# Patient Record
Sex: MALE | Race: WHITE | HISPANIC OR LATINO
[De-identification: names, ages, dates, MRNs, and addresses within clinical notes are randomized per-mention and may not be internally consistent; named-entity substitution may affect disease eponyms.]

---

## 2022-06-02 PROBLEM — Z00.00 ENCOUNTER FOR PREVENTIVE HEALTH EXAMINATION: Status: ACTIVE | Noted: 2022-06-02

## 2022-06-03 ENCOUNTER — NON-APPOINTMENT (OUTPATIENT)
Age: 32
End: 2022-06-03

## 2022-06-03 ENCOUNTER — APPOINTMENT (OUTPATIENT)
Dept: UROLOGY | Facility: CLINIC | Age: 32
End: 2022-06-03
Payer: SELF-PAY

## 2022-06-03 VITALS
SYSTOLIC BLOOD PRESSURE: 118 MMHG | DIASTOLIC BLOOD PRESSURE: 74 MMHG | HEIGHT: 68 IN | TEMPERATURE: 98 F | BODY MASS INDEX: 23.49 KG/M2 | HEART RATE: 82 BPM | WEIGHT: 155 LBS

## 2022-06-03 DIAGNOSIS — N48.89 OTHER SPECIFIED DISORDERS OF PENIS: ICD-10-CM

## 2022-06-03 PROCEDURE — 99203 OFFICE O/P NEW LOW 30 MIN: CPT

## 2022-06-03 RX ORDER — CLOTRIMAZOLE AND BETAMETHASONE DIPROPIONATE 10; .5 MG/G; MG/G
1-0.05 CREAM TOPICAL TWICE DAILY
Qty: 2 | Refills: 3 | Status: ACTIVE | COMMUNITY
Start: 2022-06-03 | End: 1900-01-01

## 2022-06-03 NOTE — LETTER BODY
[Dear  ___] : Dear  [unfilled], [Consult Letter:] : I had the pleasure of evaluating your patient, [unfilled]. [Please see my note below.] : Please see my note below. [Consult Closing:] : Thank you very much for allowing me to participate in the care of this patient.  If you have any questions, please do not hesitate to contact me. [FreeTextEntry3] : Best Regards, \par \par Jenniffer Davey MD\par

## 2022-06-03 NOTE — ASSESSMENT
[FreeTextEntry1] : Findings today are consistent with fungal balanitis and a tethered foreskin in this uncircumcised 32-year-old male.  I discussed etiologies of the present conditions and recommended that we treat this with Lotrisone cream for 3 weeks reassess in 1 month.  We discussed the possible need for a frenulotomy and plastic repair which could be performed under local anesthesia in the office.  We will reassess for this procedure if necessary on my next visit.\par \par I also taught this patient testicular self-examination and recommended that he do this on a monthly basis into the age of 37. Jenniffer Davey MD\par

## 2022-06-03 NOTE — PHYSICAL EXAM
[General Appearance - Well Developed] : well developed [General Appearance - Well Nourished] : well nourished [Normal Appearance] : normal appearance [Well Groomed] : well groomed [General Appearance - In No Acute Distress] : no acute distress [Edema] : no peripheral edema [Respiration, Rhythm And Depth] : normal respiratory rhythm and effort [Exaggerated Use Of Accessory Muscles For Inspiration] : no accessory muscle use [Abdomen Soft] : soft [Abdomen Tenderness] : non-tender [Abdomen Hernia] : no hernia was discovered [Costovertebral Angle Tenderness] : no ~M costovertebral angle tenderness [Urethral Meatus] : meatus normal [Penis Abnormality] : normal uncircumcised penis [Epididymis] : the epididymides were normal [Testes Tenderness] : no tenderness of the testes [Testes Mass (___cm)] : there were no testicular masses [FreeTextEntry1] : Mild erythema of the glans and tethered foreskin, no phimosis. [Normal Station and Gait] : the gait and station were normal for the patient's age [] : no rash [No Focal Deficits] : no focal deficits [Oriented To Time, Place, And Person] : oriented to person, place, and time [Affect] : the affect was normal [Mood] : the mood was normal [Not Anxious] : not anxious

## 2022-06-03 NOTE — HISTORY OF PRESENT ILLNESS
[FreeTextEntry1] : 33 YO M seen TODAY 6/3/2022 as NPT for penile discomfort. He told me that this started 3 - 4 months ago autumn his partner had a yeast infection. he noted itch on his glans, no rash. Treated by his PCP with a cream which he used for 3 - 4 days with relief. Then about 4 - 5 weeks ago he developed pain in the glans and down the ventral side of the shaft of the penis with heightened sensitivity This has also affected his ejaculations and erections. He went to his PCP and UA, C+S and GC/Chlamydia tests were all negative. He was treated with doxycycline x 10 days.  This helped most of the pain  but he continues to have discomfort in the tip of the penis.\par UA unable to leave a specimen today\par IPSS 18\par HANNAH 12\par JIM 3\par \par No medication, NKMA.  The patient denies fevers, chills, nausea and or vomiting and no unexplained weight loss. \par All pertinent parts of the patient PFSH (past medical, family and social histories), laboratory, radiological studies and physician notes were reviewed prior to starting the face to face portion of the  visit. Questionnaire results were discussed with patient.\par

## 2022-07-01 ENCOUNTER — APPOINTMENT (OUTPATIENT)
Dept: UROLOGY | Facility: CLINIC | Age: 32
End: 2022-07-01

## 2022-07-01 VITALS
WEIGHT: 155 LBS | HEIGHT: 68 IN | DIASTOLIC BLOOD PRESSURE: 65 MMHG | HEART RATE: 76 BPM | TEMPERATURE: 94.7 F | BODY MASS INDEX: 23.49 KG/M2 | SYSTOLIC BLOOD PRESSURE: 111 MMHG

## 2022-07-01 DIAGNOSIS — N48.1 BALANITIS: ICD-10-CM

## 2022-07-01 PROCEDURE — 99072 ADDL SUPL MATRL&STAF TM PHE: CPT

## 2022-07-01 PROCEDURE — 99213 OFFICE O/P EST LOW 20 MIN: CPT

## 2022-07-01 NOTE — ASSESSMENT
[FreeTextEntry1] : This patient has had a good response to Lotrisone cream with complete resolution of his inflammation however he continues to have a tethered frenulum.  We discussed the long-term implications and the probability of recurrence of his discomfort and irritation especially with sexual activity as long as the frenulum remains tired as it is.  I discussed in detail with the patient a frenulectomy in the office under local anesthesia.  He is returning to his country [place] in 5 months or so and desires to have this done by his local urologist if possible.  We will continue to evaluate him as needed if he has another flare up. Jenniffer Davey MD\par

## 2022-07-01 NOTE — PHYSICAL EXAM
[General Appearance - Well Developed] : well developed [General Appearance - Well Nourished] : well nourished [Normal Appearance] : normal appearance [Well Groomed] : well groomed [General Appearance - In No Acute Distress] : no acute distress [Penis Abnormality] : normal uncircumcised penis [FreeTextEntry1] : Tethered frenulum remains but is not inflamed, foreskin is able to be retracted but pulls on the frenulum.

## 2022-07-01 NOTE — LETTER BODY
[Dear  ___] : Dear  [unfilled], [Courtesy Letter:] : I had the pleasure of seeing your patient, [unfilled], in my office today. [Please see my note below.] : Please see my note below. [Consult Closing:] : Thank you very much for allowing me to participate in the care of this patient.  If you have any questions, please do not hesitate to contact me. [FreeTextEntry3] : Best Regards, \par \par Jenniffer Davey MD\par

## 2022-07-01 NOTE — HISTORY OF PRESENT ILLNESS
[FreeTextEntry1] : 33 YO M seen  6/3/2022 as NPT for penile discomfort. He told me that this started 3 - 4 months ago autumn his partner had a yeast infection. he noted itch on his glans, no rash. Treated by his PCP with a cream which he used for 3 - 4 days with relief. Then about 4 - 5 weeks ago he developed pain in the glans and down the ventral side of the shaft of the penis with heightened sensitivity This has also affected his ejaculations and erections. He went to his PCP and UA, C+S and GC/Chlamydia tests were all negative. He was treated with doxycycline x 10 days.  This helped most of the pain  but he continues to have discomfort in the tip of the penis.\par UA unable to leave a specimen today\par IPSS 18\par HANNAH 12\par JIM 3\par Findings today are consistent with fungal balanitis and a tethered foreskin in this uncircumcised 32-year-old male. I discussed etiologies of the present conditions and recommended that we treat this with Lotrisone cream for 3 weeks reassess in 1 month. We discussed the possible need for a frenulotomy and plastic repair which could be performed under local anesthesia in the office. We will reassess for this procedure if necessary on my next visit.\par I also taught this patient testicular self-examination and recommended that he do this on a monthly basis into the age of 37. \par \par Patient seen TODAY 7/1/2022 to reassess his response to the Lotrisone and assess for possible frenulotomy. He informed me that the cream has worked very well and he is much more comfortable.\par UA unable to produce a specimen today.\par \par No medication, NKMA.  The patient denies fevers, chills, nausea and or vomiting and no unexplained weight loss. \par All pertinent parts of the patient PFSH (past medical, family and social histories), laboratory, radiological studies and physician notes were reviewed prior to starting the face to face portion of the  visit. Questionnaire results were discussed with patient.\par

## 2022-09-28 ENCOUNTER — APPOINTMENT (OUTPATIENT)
Dept: UROLOGY | Facility: CLINIC | Age: 32
End: 2022-09-28

## 2022-09-28 VITALS
TEMPERATURE: 97.9 F | OXYGEN SATURATION: 97 % | SYSTOLIC BLOOD PRESSURE: 113 MMHG | HEART RATE: 72 BPM | DIASTOLIC BLOOD PRESSURE: 69 MMHG

## 2022-09-28 VITALS — OXYGEN SATURATION: 91 % | DIASTOLIC BLOOD PRESSURE: 63 MMHG | HEART RATE: 66 BPM | SYSTOLIC BLOOD PRESSURE: 108 MMHG

## 2022-09-28 PROCEDURE — 99072 ADDL SUPL MATRL&STAF TM PHE: CPT

## 2022-09-28 PROCEDURE — 54164 FRENULOTOMY OF PENIS: CPT

## 2022-09-28 RX ORDER — CEPHALEXIN 250 MG/1
250 CAPSULE ORAL
Qty: 20 | Refills: 0 | Status: ACTIVE | COMMUNITY
Start: 2022-09-28 | End: 1900-01-01

## 2022-09-28 NOTE — HISTORY OF PRESENT ILLNESS
[FreeTextEntry1] : 31 YO M seen  6/3/2022 as NPT for penile discomfort. He told me that this started 3 - 4 months ago autumn his partner had a yeast infection. he noted itch on his glans, no rash. Treated by his PCP with a cream which he used for 3 - 4 days with relief. Then about 4 - 5 weeks ago he developed pain in the glans and down the ventral side of the shaft of the penis with heightened sensitivity This has also affected his ejaculations and erections. He went to his PCP and UA, C+S and GC/Chlamydia tests were all negative. He was treated with doxycycline x 10 days.  This helped most of the pain  but he continues to have discomfort in the tip of the penis.\par UA unable to leave a specimen today\par IPSS 18\par HANNAH 12\par JIM 3\par Findings today are consistent with fungal balanitis and a tethered foreskin in this uncircumcised 32-year-old male. I discussed etiologies of the present conditions and recommended that we treat this with Lotrisone cream for 3 weeks reassess in 1 month. We discussed the possible need for a frenulotomy and plastic repair which could be performed under local anesthesia in the office. We will reassess for this procedure if necessary on my next visit.\par I also taught this patient testicular self-examination and recommended that he do this on a monthly basis into the age of 37. \par \par Patient seen 7/1/2022 to reassess his response to the Lotrisone and assess for possible frenulotomy. He informed me that the cream has worked very well and he is much more comfortable.\par UA unable to produce a specimen today.\par This patient has had a good response to Lotrisone cream with complete resolution of his inflammation however he continues to have a tethered frenulum. We discussed the long-term implications and the probability of recurrence of his discomfort and irritation especially with sexual activity as long as the frenulum remains tired as it is. I discussed in detail with the patient a frenulectomy in the office under local anesthesia. He is returning to his country of origin in 5 months or so and desires to have this done by his local urologist if possible. We will continue to evaluate him as needed if he has another flare up. \par \par Patient seen TODAY 9/28/2022 for phrenulotomy in the office. Phrenulotomy with plastic repair performed under local with NO, tolerated well.\par \par No medication, NKMA.  The patient denies fevers, chills, nausea and or vomiting and no unexplained weight loss. \par All pertinent parts of the patient PFSH (past medical, family and social histories), laboratory, radiological studies and physician notes were reviewed prior to starting the face to face portion of the  visit. Questionnaire results were discussed with patient.\par

## 2022-09-28 NOTE — ASSESSMENT
[FreeTextEntry1] : FU 2 weeks for post op wound check, Keflex 250 QID x 5 days, Xeroform gauze with Bacitracin to wound daily. Jenniffer Davey MD\par

## 2022-10-07 ENCOUNTER — APPOINTMENT (OUTPATIENT)
Dept: UROLOGY | Facility: CLINIC | Age: 32
End: 2022-10-07

## 2022-10-07 DIAGNOSIS — R30.0 DYSURIA: ICD-10-CM

## 2022-10-07 PROCEDURE — 99024 POSTOP FOLLOW-UP VISIT: CPT

## 2022-10-07 NOTE — ASSESSMENT
[FreeTextEntry1] : His incision on the ventral side of his penis is clean, dry, and intact, with Xeroform in place, healing very well. We discussed that his dysuria may be due to foreskin irritation. I reviewed the importance of proper hygiene and care with the foreskin. He will follow up in 6 weeks if no issues persist. Jenniffer Davey MD\par

## 2022-10-07 NOTE — HISTORY OF PRESENT ILLNESS
[FreeTextEntry1] : 33 YO M seen TODAY 10/7/2022 after undergoing frenulectomy on 9/28/2022. He reports doing well immediately after the procedure, but developed dysuria 2 days ago. No fevers, chills, or gross hematuria. He completed his 5-day course of Keflex on Monday, 10/3. He is presently less symptomatic.\par UA not done, patient unable to leave a specimen.

## 2022-10-07 NOTE — PHYSICAL EXAM
[General Appearance - Well Developed] : well developed [General Appearance - Well Nourished] : well nourished [Normal Appearance] : normal appearance [Well Groomed] : well groomed [General Appearance - In No Acute Distress] : no acute distress [Urethral Meatus] : meatus normal [Penis Abnormality] : normal uncircumcised penis [Scrotum] : the scrotum was normal [Oriented To Time, Place, And Person] : oriented to person, place, and time [Affect] : the affect was normal [Mood] : the mood was normal [Not Anxious] : not anxious [FreeTextEntry1] : xeroform on penis; incision clean, dry, and intact, healing well, no discharge or erythema.

## 2022-10-12 ENCOUNTER — APPOINTMENT (OUTPATIENT)
Dept: UROLOGY | Facility: CLINIC | Age: 32
End: 2022-10-12

## 2022-10-12 NOTE — HISTORY OF PRESENT ILLNESS
[FreeTextEntry1] : 31 YO M seen  10/7/2022 after undergoing frenulectomy on 9/28/2022. He reports doing well immediately after the procedure, but developed dysuria 2 days ago. No fevers, chills, or gross hematuria. He completed his 5-day course of Keflex on Monday, 10/3. He is presently less symptomatic.\par UA not done, patient unable to leave a specimen.\par His incision on the ventral side of his penis is clean, dry, and intact, with Xeroform in place, healing very well. We discussed that his dysuria may be due to foreskin irritation. I reviewed the importance of proper hygiene and care with the foreskin. He will follow up in 6 weeks if no issues persist. \par \par Patient seen TODAY 10/12.2022 to reassess.

## 2022-11-04 ENCOUNTER — APPOINTMENT (OUTPATIENT)
Dept: UROLOGY | Facility: CLINIC | Age: 32
End: 2022-11-04

## 2022-11-04 ENCOUNTER — NON-APPOINTMENT (OUTPATIENT)
Age: 32
End: 2022-11-04

## 2022-11-04 VITALS
SYSTOLIC BLOOD PRESSURE: 109 MMHG | BODY MASS INDEX: 23.49 KG/M2 | HEIGHT: 68 IN | WEIGHT: 155 LBS | HEART RATE: 69 BPM | TEMPERATURE: 98.3 F | DIASTOLIC BLOOD PRESSURE: 68 MMHG

## 2022-11-04 LAB
BILIRUB UR QL STRIP: NORMAL
CLARITY UR: CLEAR
COLLECTION METHOD: NORMAL
GLUCOSE UR-MCNC: NORMAL
HCG UR QL: 0.2 EU/DL
HGB UR QL STRIP.AUTO: NORMAL
KETONES UR-MCNC: NORMAL
LEUKOCYTE ESTERASE UR QL STRIP: NORMAL
NITRITE UR QL STRIP: NORMAL
PH UR STRIP: 7
PROT UR STRIP-MCNC: NORMAL
SP GR UR STRIP: 1.02

## 2022-11-04 PROCEDURE — 99072 ADDL SUPL MATRL&STAF TM PHE: CPT

## 2022-11-04 PROCEDURE — 99213 OFFICE O/P EST LOW 20 MIN: CPT

## 2022-11-04 PROCEDURE — 81003 URINALYSIS AUTO W/O SCOPE: CPT | Mod: QW

## 2022-11-04 NOTE — HISTORY OF PRESENT ILLNESS
[FreeTextEntry1] : 33 YO M seen  10/7/2022 after undergoing frenulectomy on 9/28/2022. He reports doing well immediately after the procedure, but developed dysuria 2 days ago. No fevers, chills, or gross hematuria. He completed his 5-day course of Keflex on Monday, 10/3. He is presently less symptomatic.\par UA not done, patient unable to leave a specimen.\par His incision on the ventral side of his penis is clean, dry, and intact, with Xeroform in place, healing very well. We discussed that his dysuria may be due to foreskin irritation. I reviewed the importance of proper hygiene and care with the foreskin. He will follow up in 6 weeks if no issues persist. \par \par Patient [facility] 10/12.2022 to reassess.\par \par Patient seen TODAY 11/4/2022 to reassess. Patient is doing well. Feels like he is healing well and offers no complaints.\par UA negative

## 2022-11-04 NOTE — ASSESSMENT
[FreeTextEntry1] : Patient is doing well and incision is well-healed. He can resume normal activities at 6 weeks post-op which is in a few days. He was instructed to perform monthly testicular exams until age 37. He can follow up as needed. Jenniffer Davey MD\par

## 2022-11-04 NOTE — PHYSICAL EXAM
[General Appearance - Well Developed] : well developed [General Appearance - Well Nourished] : well nourished [Normal Appearance] : normal appearance [Well Groomed] : well groomed [General Appearance - In No Acute Distress] : no acute distress [Urethral Meatus] : meatus normal [Penis Abnormality] : normal uncircumcised penis [Testes Tenderness] : no tenderness of the testes [Testes Mass (___cm)] : there were no testicular masses [Normal Station and Gait] : the gait and station were normal for the patient's age [FreeTextEntry1] : incision well healed, no discharge or erythema

## 2022-12-07 ENCOUNTER — APPOINTMENT (OUTPATIENT)
Dept: UROLOGY | Facility: CLINIC | Age: 32
End: 2022-12-07

## 2022-12-07 NOTE — HISTORY OF PRESENT ILLNESS
[FreeTextEntry1] : 33 YO M seen  10/7/2022 after undergoing frenulectomy on 9/28/2022. He reports doing well immediately after the procedure, but developed dysuria 2 days ago. No fevers, chills, or gross hematuria. He completed his 5-day course of Keflex on Monday, 10/3. He is presently less symptomatic.\par UA not done, patient unable to leave a specimen.\par His incision on the ventral side of his penis is clean, dry, and intact, with Xeroform in place, healing very well. We discussed that his dysuria may be due to foreskin irritation. I reviewed the importance of proper hygiene and care with the foreskin. He will follow up in 6 weeks if no issues persist. \par \par Patient [facility] 10/12.2022 to reassess.\par Patient is doing well and incision is well-healed. He can resume normal activities at 6 weeks post-op which is in a few days. He was instructed to perform monthly testicular exams until age 37. He can follow up as needed. \par \par \par Patient seen  11/4/2022 to reassess. Patient is doing well. Feels like he is healing well and offers no complaints.\par UA negative\par \par Patient seen TODAY 12/7/2022\par \par UA\par IPSS

## 2022-12-23 ENCOUNTER — APPOINTMENT (OUTPATIENT)
Dept: UROLOGY | Facility: CLINIC | Age: 32
End: 2022-12-23

## 2022-12-23 DIAGNOSIS — Q55.69 OTHER CONGENITAL MALFORMATION OF PENIS: ICD-10-CM

## 2022-12-23 NOTE — HISTORY OF PRESENT ILLNESS
[FreeTextEntry1] : 33 YO M seen  10/7/2022 after undergoing frenulectomy on 9/28/2022. He reports doing well immediately after the procedure, but developed dysuria 2 days ago. No fevers, chills, or gross hematuria. He completed his 5-day course of Keflex on Monday, 10/3. He is presently less symptomatic.\par UA not done, patient unable to leave a specimen.\par His incision on the ventral side of his penis is clean, dry, and intact, with Xeroform in place, healing very well. We discussed that his dysuria may be due to foreskin irritation. I reviewed the importance of proper hygiene and care with the foreskin. He will follow up in 6 weeks if no issues persist. \par \par Patient [facility] 10/12.2022 to reassess.\par Patient is doing well and incision is well-healed. He can resume normal activities at 6 weeks post-op which is in a few days. He was instructed to perform monthly testicular exams until age 37. He can follow up as needed. \par \par Patient seen TODAY 12/23/2022\par \par Patient seen  11/4/2022 to reassess. Patient is doing well. Feels like he is healing well and offers no complaints.\par UA negative